# Patient Record
(demographics unavailable — no encounter records)

---

## 2025-03-04 NOTE — HISTORY OF PRESENT ILLNESS
[FreeTextEntry1] : 75-year-old woman with recent hospitalization for acute limb ischemia s/p endovascular revascularization of the right leg. [de-identified] : Persistent right leg rest pain.

## 2025-03-04 NOTE — HISTORY OF PRESENT ILLNESS
[FreeTextEntry1] : 75-year-old woman with recent hospitalization for acute limb ischemia s/p endovascular revascularization of the right leg. [de-identified] : Persistent right leg rest pain.

## 2025-03-04 NOTE — DATA REVIEWED
[FreeTextEntry1] : R FRANCOISE 0.77 R TBI 0.41 (toe pressure 50mmHg)  L FRANCOISE 1.12 L TBI 1.02 (toe pressure 126mmHg)

## 2025-03-04 NOTE — PHYSICAL EXAM
[Respiratory Effort] : normal respiratory effort [Normal Rate and Rhythm] : normal rate and rhythm [0] : right 0 [1+] : left 1+ [Ankle Swelling (On Exam)] : not present [Varicose Veins Of Lower Extremities] : not present [] : not present [Abdomen Tenderness] : ~T ~M No abdominal tenderness [Skin Ulcer] : no ulcer [Alert] : alert [Oriented to Person] : oriented to person [Oriented to Place] : oriented to place [Oriented to Time] : oriented to time [Calm] : calm [de-identified] : appears stated age [de-identified] : normocephalic, atraumatic [de-identified] : supple

## 2025-03-04 NOTE — PHYSICAL EXAM
[Respiratory Effort] : normal respiratory effort [Normal Rate and Rhythm] : normal rate and rhythm [0] : right 0 [1+] : left 1+ [Ankle Swelling (On Exam)] : not present [Varicose Veins Of Lower Extremities] : not present [] : not present [Abdomen Tenderness] : ~T ~M No abdominal tenderness [Skin Ulcer] : no ulcer [Alert] : alert [Oriented to Person] : oriented to person [Oriented to Place] : oriented to place [Oriented to Time] : oriented to time [Calm] : calm [de-identified] : appears stated age [de-identified] : normocephalic, atraumatic [de-identified] : supple

## 2025-03-04 NOTE — ASSESSMENT
[FreeTextEntry1] : Problem #1 peripheral arterial disease chronic limb threatening ischemia of the right lower extremity, [place] 4 will schedule for right leg angiogram, possible revascularization risks including but not limited to bleeding, infection, vessel injury, vessel thrombosis were discussed with patient she is agreeable to proceed

## 2025-03-04 NOTE — ASSESSMENT
[FreeTextEntry1] : Problem #1 peripheral arterial disease chronic limb threatening ischemia of the right lower extremity, [place] 4 will schedule for right leg angiogram, possible revascularization risks including but not limited to bleeding, infection, vessel injury, vessel thrombosis were discussed with patient she is agreeable to proceed

## 2025-03-28 NOTE — PHYSICAL EXAM
[Respiratory Effort] : normal respiratory effort [Normal Rate and Rhythm] : normal rate and rhythm [0] : right 0 [1+] : left 1+ [Ankle Swelling (On Exam)] : not present [Varicose Veins Of Lower Extremities] : not present [] : not present [Abdomen Tenderness] : ~T ~M No abdominal tenderness [Skin Ulcer] : no ulcer [Alert] : alert [Oriented to Person] : oriented to person [Oriented to Place] : oriented to place [Oriented to Time] : oriented to time [Calm] : calm [de-identified] : appears stated age [de-identified] : normocephalic, atraumatic [de-identified] : supple

## 2025-03-28 NOTE — ASSESSMENT
[FreeTextEntry1] : Problem #1 peripheral arterial disease s/p R EIA angioplasty with stent rest pain in right foot has resolved continue best medical therapy follow up as scheduled for surveillance

## 2025-03-28 NOTE — PHYSICAL EXAM
[Respiratory Effort] : normal respiratory effort [Normal Rate and Rhythm] : normal rate and rhythm [0] : right 0 [1+] : left 1+ [Ankle Swelling (On Exam)] : not present [Varicose Veins Of Lower Extremities] : not present [] : not present [Abdomen Tenderness] : ~T ~M No abdominal tenderness [Skin Ulcer] : no ulcer [Alert] : alert [Oriented to Person] : oriented to person [Oriented to Place] : oriented to place [Oriented to Time] : oriented to time [Calm] : calm [de-identified] : appears stated age [de-identified] : normocephalic, atraumatic [de-identified] : supple

## 2025-03-28 NOTE — HISTORY OF PRESENT ILLNESS
[FreeTextEntry1] : 75-year-old woman with recent hospitalization for acute limb ischemia s/p endovascular revascularization of the right leg.  02/26/25 - Persistent right leg rest pain. 03/10/25 - Right leg angio, diagnostic [de-identified] : Rest pain has resolved. Denies claudication.

## 2025-03-28 NOTE — HISTORY OF PRESENT ILLNESS
[FreeTextEntry1] : 75-year-old woman with recent hospitalization for acute limb ischemia s/p endovascular revascularization of the right leg.  02/26/25 - Persistent right leg rest pain. 03/10/25 - Right leg angio, diagnostic [de-identified] : Rest pain has resolved. Denies claudication.

## 2025-04-06 NOTE — HISTORY OF PRESENT ILLNESS
[FreeTextEntry1] : Lauren Callahan is a 75 year old female who presents for follow up on Type 2 DM management. DM diagnosed 2000 Complications: CVAs, CAD, CHF w/ICD, CKD stage 3, PAD   Patient of Dr. Powers, last seen June 2024   PMHx: CHF, cardiomyopathy with ICD, CVA, GERD, atrial fibrillation, HLD, HTN, CKD stage 3, Obesity, PAD, thyroid nodule PSHx:  FMHx: NKDA  Stopped Basaglar after last visit with Dr. Powers in June 2024  Recent bloodwork??  Current medication regimen:  -- Ozempic 1mg weekly   POCT A1c in office: FS in office:   Lauren measures blood glucose via glucometer/fingerstick or Freestyle Maruice continuous glucose monitor: FBS PPBS: Hypoglycemia:   Date of report download: % time with CGM in use:   Time high: (time BG >251: ) Time in range: Time low: (time BG <54: )   GMI Average glucose: Glucose variability:   CGM analysis reveals:   Podiatry: Ophthalmology:   Diet Exercise   Current Medications: Allopurinol 100mg daily, aspirin 81mg daily, atorvastatin 80mg daily, carvedilol 25mg BID, eliquis 5mg BID, ferrous gluconate 324mg daily, hydralazine 50mg TID, isosorbide dinitrate 30mg TID, ozempic 1mg weekly, torsemide 20mg BID, Vitamin D 50, 000 IU daily

## 2025-04-06 NOTE — ASSESSMENT
[FreeTextEntry1] : Type 2 DM, ** well controlled, with complications including CAD, CHF w/ICD, CVAs, PAD, CKD stage 3   Current regimen: Ozempic 1mg weekly   Home blood glucose monitoring reveals   Goal A1c <7%, A1c Goal BP <130/80, BP today  , takes carvedilol/torsemide/hydralazine daily Goal LDL <55mg/dL, last LDL 39 (2018), on atorvastatin 80mg daily   Plan   -- Follow up

## 2025-05-01 NOTE — DISCUSSION/SUMMARY
[Patient] : the patient [EKG obtained to assist in diagnosis and management of assessed problem(s)] : EKG obtained to assist in diagnosis and management of assessed problem(s) [FreeTextEntry3] : 6 months [FreeTextEntry1] : Continue current meds. Asked her to check with Vascular surgery if she needs to continue aspirin  Pt will check with ENDO for continuation of Ozempic which was stopped by patient for vascular surgery but never restarted. BS has been normal on Free style marce sensor.  RTO in 2-3 months

## 2025-05-01 NOTE — HISTORY OF PRESENT ILLNESS
[FreeTextEntry1] : Lauren Callahan is a 75 F with HTN, DM (not on meds), previous strokes with CVA x 3 S/P residual aphasia with right sided weakness, AF on Eliquis (S/P BRIAN with DCCV 4/3/23), CAD, AWSTEMI s/p DARRICK to mLAD 3/2018, ICM (EF 31% 5/2018) resulting in severe LV dysfunction, single chamber MDT AICD placed on 7/3/19.   TTE 6/24/20 with improved LVEF 59%, LVIDD 5.4 cm with last 4/19/21 TTE LVEF with decline in LVEF 26%, LVIDD 5.7 cm severe LV systolic dysfunction, mild mod MR, mild mod TR, severe pHTN. S/P continuous AF since 1/2023, S/P BRIAN with DCCV 4/3/23. Echo in past LVEF 45-50%, TTE 1/2025 EF 30-35% and ST 1/2025 EF 19% with fixed defects, PAD s/p stents placed in her RLE.   Pt is here for a follow up today.   Patient presents today 5/1/25 for follow-up.  Since last visit she has stents placed in her RLE. Has dry ulcer on right foot that is stable.    She is doing well. Has been mostly sedentary.  No orthopnea or PND. She can walk <1 block which limited by right toe pain.  She can climb about 1 flight of stairs with some fatigue. Appetite is good with frequent BM after eating.   Good urinations with diuretics. Has not needed additional diuretics.   BP at home not checked, in office today is 109/66, denies CP or LH.  Weight at home is 216-220lbs, 223lbs in office.   Denies CP, orthopnea, PND,  bendopnea, LH/dizziness, abd swelling, or LE edema, no ICD shocks.

## 2025-05-01 NOTE — PHYSICAL EXAM
[Well Nourished] : well nourished [No Acute Distress] : no acute distress [Normal Venous Pressure] : normal venous pressure [No Carotid Bruit] : no carotid bruit [No Gallop] : no gallop [Clear Lung Fields] : clear lung fields [Good Air Entry] : good air entry [No Respiratory Distress] : no respiratory distress  [Soft] : abdomen soft [Non Tender] : non-tender [Moves all extremities] : moves all extremities [Alert and Oriented] : alert and oriented [de-identified] : irregularly irregular [de-identified] : tight skin but no pitting edema, boot on right foot  [de-identified] : mild dysarthria

## 2025-05-01 NOTE — REVIEW OF SYSTEMS
[SOB] : no shortness of breath [Lower Ext Edema] : no extremity edema [Orthopnea] : no orthopnea [PND] : no PND [FreeTextEntry2] : see HPI

## 2025-05-01 NOTE — ASSESSMENT
[FreeTextEntry1] : Lauren Callahan is a 75 F with HTN, DM (not on meds), previous strokes with CVA x 3 S/P residual aphasia with right sided weakness, AF on Eliquis (S/P BRIAN with DCCV 4/3/23), CAD, AWSTEMI s/p DARRICK to mLAD 3/2018, ICM (EF 31% 5/2018) resulting in severe LV dysfunction, single chamber MDT AICD placed on 7/3/19.   TTE 6/24/20 with improved LVEF 59%, LVIDD 5.4 cm with last 4/19/21 TTE LVEF with decline in LVEF 26%, LVIDD 5.7 cm severe LV systolic dysfunction, mild mod MR, mild mod TR, severe pHTN. S/P continuous AF since 1/2023, S/P BRIAN with DCCV 4/3/23. Echo in past LVEF 45-50%, TTE 1/2025 EF 30-35% and ST 1/2025 EF 19% with fixed defects, PAD s/p stents placed in her RLE.   ACC/AHA Stage C, NYHA Class II-III symptoms  Appears euvolemic and low/normotensive with EKG with AF.

## 2025-05-01 NOTE — CARDIOLOGY SUMMARY
[de-identified] : 5/1/25 Atrial fibrillation, 76bpm, occ PVC's, PRWP, low voltage, Diffuse nonspecific T-abnormality. 3/27/24 AF 70, TWI, NSST 10/26/23  AF 84, TWI, NST 4/24/23 AF 92, TWI, NST 3/27/23 AF 82, TWI, NSTT 3/20/23 AF 97, TWI, NSTT 8/4/22 NSR 85, with TWI, NSST 11/17/21 NSR with 1st degree AVB, 63, TWI, NSST   [de-identified] : 4/3/23 BRIAN LVEF 25%, mild MR, mod LAE, severe LV systolic dysfunction, decreased RV systolic dysfx, severe pHTN 4/19/21 BRIAN LVEF 26%, LVIDD 5.7 cm severe LV systolic dysfunction, mild mod MR, mild mod TR, severe pHTN 6/24/20 TTE with improved LVEF 59%, LVIDD 5.4 cm,   6/19/19 TTE LVEF 28% with mild MR, severe global LV systolic dysfunction, mod diastolic dysfunction Stage II, decreased RV systolic function, estimated RV systolic pressure 62 mmHg c/w severe pHTN, severe TR.  7/31/17 TTE: LVEF 45-50%, LVIDD 5 cm, mod LV systolic dysfunction, mod diastolic dysfunction, mild TR, mod pHTN

## 2025-07-15 NOTE — HISTORY OF PRESENT ILLNESS
[FreeTextEntry1] : Lauren Callahan is a 75 F with HTN, DM (not on meds), previous strokes with CVA x 3 S/P residual aphasia with right sided weakness, AF on Eliquis (S/P BRIAN with DCCV 4/3/23), CAD, AWSTEMI s/p DARRICK to mLAD 3/2018, ICM (EF 31% 5/2018) resulting in severe LV dysfunction, single chamber MDT AICD placed on 7/3/19.   TTE 6/24/20 with improved LVEF 59%, LVIDD 5.4 cm with last 4/19/21 TTE LVEF with decline in LVEF 26%, LVIDD 5.7 cm severe LV systolic dysfunction, mild mod MR, mild mod TR, severe pHTN. S/P continuous AF since 1/2023, S/P BRIAN with DCCV 4/3/23. Echo in past LVEF 45-50%, TTE 1/2025 EF 30-35% and ST 1/2025 EF 19% with fixed defects, PAD s/p stents placed in her RLE, CKD.   Pt is here for a follow up today.   Patient presents today 7/15/25 for follow-up.     She is doing well. Continues to be mostly sedentary.  No orthopnea or PND. She can walk <1 block which limited by right toe pain.  She can climb about 1 flight of stairs with some fatigue. Appetite is good BM daily.   Good urinations with diuretics. Has not needed additional diuretics.   BP at home not checked, in office today is 171/92 and 132/91, she did not take meds at the midday dose, denies CP or LH.  Weight at home is 223lbs, 223lbs in office.   Denies CP, orthopnea, PND,  bendopnea, LH/dizziness, abd swelling, or LE edema, no ICD shocks.

## 2025-07-15 NOTE — DISCUSSION/SUMMARY
[Patient] : the patient [EKG obtained to assist in diagnosis and management of assessed problem(s)] : EKG obtained to assist in diagnosis and management of assessed problem(s) [FreeTextEntry3] : 6 months [FreeTextEntry1] : Continue current meds.  Take Meds this afternoon and check BP  Asked her to check with Vascular surgery if she needs to continue aspirin, she has an appt next week.  Again asked pt will check with ENDO for continuation of Ozempic which was stopped by patient for vascular surgery but never restarted. BS has been normal on Free style marce sensor.  May benefit from Nephrology consult given CKD.  Check labs today consider adding spironolactone and Farxiga.  RTO in 2-3 months

## 2025-07-15 NOTE — ASSESSMENT
[FreeTextEntry1] : Lauren Callahan is a 75 F with HTN, DM (not on meds), previous strokes with CVA x 3 S/P residual aphasia with right sided weakness, AF on Eliquis (S/P BRIAN with DCCV 4/3/23), CAD, AWSTEMI s/p DARRICK to mLAD 3/2018, ICM (EF 31% 5/2018) resulting in severe LV dysfunction, single chamber MDT AICD placed on 7/3/19.   TTE 6/24/20 with improved LVEF 59%, LVIDD 5.4 cm with last 4/19/21 TTE LVEF with decline in LVEF 26%, LVIDD 5.7 cm severe LV systolic dysfunction, mild mod MR, mild mod TR, severe pHTN. S/P continuous AF since 1/2023, S/P BRIAN with DCCV 4/3/23. Echo in past LVEF 45-50%, TTE 1/2025 EF 30-35% and ST 1/2025 EF 19% with fixed defects, PAD s/p stents placed in her RLE, CKD.   ACC/AHA Stage C, NYHA Class II-III symptoms  Appears euvolemic and hypertensive but did not take midday meds

## 2025-07-15 NOTE — CARDIOLOGY SUMMARY
[de-identified] : 7/15/25 Undetermined Tachycardia, possibly Afib, occasional PVC, PRWP, low voltage Lateral precordial leads 5/1/25 Atrial fibrillation, 76bpm, occ PVC's, PRWP, low voltage, Diffuse nonspecific T-abnormality. 3/27/24 AF 70, TWI, NSST 10/26/23  AF 84, TWI, NST 4/24/23 AF 92, TWI, NST 3/27/23 AF 82, TWI, NSTT 3/20/23 AF 97, TWI, NSTT 8/4/22 NSR 85, with TWI, NSST 11/17/21 NSR with 1st degree AVB, 63, TWI, NSST   [de-identified] : 4/3/23 BRIAN LVEF 25%, mild MR, mod LAE, severe LV systolic dysfunction, decreased RV systolic dysfx, severe pHTN 4/19/21 BRIAN LVEF 26%, LVIDD 5.7 cm severe LV systolic dysfunction, mild mod MR, mild mod TR, severe pHTN 6/24/20 TTE with improved LVEF 59%, LVIDD 5.4 cm,   6/19/19 TTE LVEF 28% with mild MR, severe global LV systolic dysfunction, mod diastolic dysfunction Stage II, decreased RV systolic function, estimated RV systolic pressure 62 mmHg c/w severe pHTN, severe TR.  7/31/17 TTE: LVEF 45-50%, LVIDD 5 cm, mod LV systolic dysfunction, mod diastolic dysfunction, mild TR, mod pHTN

## 2025-07-15 NOTE — PHYSICAL EXAM
[Well Nourished] : well nourished [No Acute Distress] : no acute distress [Normal Venous Pressure] : normal venous pressure [No Carotid Bruit] : no carotid bruit [No Gallop] : no gallop [Clear Lung Fields] : clear lung fields [Good Air Entry] : good air entry [No Respiratory Distress] : no respiratory distress  [Soft] : abdomen soft [Non Tender] : non-tender [Moves all extremities] : moves all extremities [Alert and Oriented] : alert and oriented [No Murmur] : no murmur [No Edema] : no edema [de-identified] : irregularly irregular [de-identified] : boot on right foot

## 2025-07-22 NOTE — DATA REVIEWED
[FreeTextEntry1] : No evidence of abdominal aortic aneurysm  Right FRANCOISE 0.78 Right TBI 0.94 (126 mmHg)  Left FRANCOISE 1.12  Left TBI 1.13 (152 mmHg)

## 2025-07-22 NOTE — HISTORY OF PRESENT ILLNESS
[FreeTextEntry1] : 75-year-old woman with recent hospitalization for acute limb ischemia s/p endovascular revascularization of the right leg.  02/26/25 - Persistent right leg rest pain. 03/10/25 - Right leg angio, diagnostic 03/26/25 - Rest pain has resolved. Denies claudication. [de-identified] : Denies claudication or rest pain.

## 2025-07-22 NOTE — ASSESSMENT
[FreeTextEntry1] : Problem #1 peripheral arterial disease Asymptomatic, [place] 0 Continue best medical therapy Continue daily walking for 20 to 30 minutes Encouraged regular exercise and proper hydration Follow-up as scheduled in 6 months for reevaluation

## 2025-07-22 NOTE — PHYSICAL EXAM
[Respiratory Effort] : normal respiratory effort [Normal Rate and Rhythm] : normal rate and rhythm [0] : right 0 [1+] : left 1+ [Ankle Swelling (On Exam)] : not present [Varicose Veins Of Lower Extremities] : not present [] : not present [Abdomen Tenderness] : ~T ~M No abdominal tenderness [Skin Ulcer] : no ulcer [Alert] : alert [Oriented to Person] : oriented to person [Oriented to Place] : oriented to place [Oriented to Time] : oriented to time [Calm] : calm [de-identified] : appears stated age [de-identified] : normocephalic, atraumatic [de-identified] : supple